# Patient Record
Sex: MALE | Race: WHITE | NOT HISPANIC OR LATINO | Employment: FULL TIME | URBAN - METROPOLITAN AREA
[De-identification: names, ages, dates, MRNs, and addresses within clinical notes are randomized per-mention and may not be internally consistent; named-entity substitution may affect disease eponyms.]

---

## 2018-07-25 ENCOUNTER — TRANSFERRED RECORDS (OUTPATIENT)
Dept: HEALTH INFORMATION MANAGEMENT | Facility: CLINIC | Age: 38
End: 2018-07-25

## 2021-04-08 ENCOUNTER — IMMUNIZATION (OUTPATIENT)
Dept: NURSING | Facility: CLINIC | Age: 41
End: 2021-04-08
Payer: COMMERCIAL

## 2021-04-08 PROCEDURE — 91300 PR COVID VAC PFIZER DIL RECON 30 MCG/0.3 ML IM: CPT

## 2021-04-08 PROCEDURE — 0001A PR COVID VAC PFIZER DIL RECON 30 MCG/0.3 ML IM: CPT

## 2021-04-11 ENCOUNTER — HEALTH MAINTENANCE LETTER (OUTPATIENT)
Age: 41
End: 2021-04-11

## 2021-04-29 ENCOUNTER — IMMUNIZATION (OUTPATIENT)
Dept: NURSING | Facility: CLINIC | Age: 41
End: 2021-04-29
Attending: INTERNAL MEDICINE
Payer: COMMERCIAL

## 2021-04-29 PROCEDURE — 91300 PR COVID VAC PFIZER DIL RECON 30 MCG/0.3 ML IM: CPT

## 2021-04-29 PROCEDURE — 0002A PR COVID VAC PFIZER DIL RECON 30 MCG/0.3 ML IM: CPT

## 2021-08-24 ENCOUNTER — NURSE TRIAGE (OUTPATIENT)
Dept: NURSING | Facility: CLINIC | Age: 41
End: 2021-08-24

## 2021-08-25 NOTE — TELEPHONE ENCOUNTER
"\"I have been having pain and tingling on both side of my face for several days now. I have some mouth issues and sores that I deal with, but this doesn't feel like that.   It's the jaw area on both sides.   It's a weird \"pulling\" feeling.  Last night my heart rate was 100. If I touch it , it gets worse.I am concerned about tetanus. I have not have any big cuts or nails in the foot, things like that, but I have been working on some outdoor projects and fencing and had a few small cuts. I can't remember my last tetanus shot. I would like to make an appt\"   Denies any other sx at this time  Triaged,. Gave home care advice and when to seek emergency care tonight if needed   Also advised to be seen within 24 hrs and transferred to scheduling for appt  Ariana Whitten RN [facility]           Reason for Disposition    Face pain present > 24 hours    Additional Information    Negative: Difficulty breathing or unusual sweating (e.g., sweating without exertion)    Negative: Can't close the mouth fully (i.e., \"mouth is locked open\", patient will have difficulty talking)    Negative: [1] Fever AND [2] localized red rash    Negative: [1] Fever AND [2] area of face is swollen    Negative: [1] New onset jaw pain AND [2] unknown cause AND [3] at least one cardiac risk factor (i.e., hypertension, diabetes, obesity, smoker or strong family history of heart disease)    Negative: Patient sounds very sick or weak to the triager    Negative: [1] SEVERE pain (e.g., excruciating) AND [2] not improved after 2 hours of pain medicine    Negative: [1] Localized red rash AND [2] painful to the touch    Negative: [1] Painful rash AND [2] multiple small blisters grouped together (i.e., dermatomal distribution or \"band\" or \"stripe\" on one side of face)    Negative: [1] Swollen area of face AND [2] toothache    Negative: [1] Swollen area of face AND [2] is painful to touch    Negative: Swelling around the eye    Negative: Shock " "suspected (e.g., cold/pale/clammy skin, too weak to stand, low BP, rapid pulse)    Negative: [1] Similar pain previously AND [2] it was from \"heart attack\"    Negative: [1] Similar pain previously AND [2] it was from \"angina\" AND [3] not relieved by nitroglycerin    Negative: Sounds like a life-threatening emergency to the triager    Protocols used: FACE PAIN-A-AH      "

## 2021-09-25 ENCOUNTER — HEALTH MAINTENANCE LETTER (OUTPATIENT)
Age: 41
End: 2021-09-25

## 2022-05-07 ENCOUNTER — HEALTH MAINTENANCE LETTER (OUTPATIENT)
Age: 42
End: 2022-05-07

## 2022-06-21 ENCOUNTER — HOSPITAL ENCOUNTER (EMERGENCY)
Facility: CLINIC | Age: 42
Discharge: HOME | End: 2022-06-21
Attending: EMERGENCY MEDICINE | Admitting: EMERGENCY MEDICINE
Payer: COMMERCIAL

## 2022-06-21 VITALS
SYSTOLIC BLOOD PRESSURE: 140 MMHG | HEART RATE: 86 BPM | DIASTOLIC BLOOD PRESSURE: 95 MMHG | BODY MASS INDEX: 29.89 KG/M2 | TEMPERATURE: 98.1 F | OXYGEN SATURATION: 97 % | WEIGHT: 225 LBS | RESPIRATION RATE: 18 BRPM

## 2022-06-21 DIAGNOSIS — T18.128A FOOD IMPACTION OF ESOPHAGUS, INITIAL ENCOUNTER: ICD-10-CM
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
## 2022-06-21 LAB — SARS-COV-2 RNA RESP QL NAA+PROBE: NEGATIVE

## 2022-06-21 PROCEDURE — 255N000001 HC RX 255: Performed by: EMERGENCY MEDICINE

## 2022-06-21 PROCEDURE — U0003 INFECTIOUS AGENT DETECTION BY NUCLEIC ACID (DNA OR RNA); SEVERE ACUTE RESPIRATORY SYNDROME CORONAVIRUS 2 (SARS-COV-2) (CORONAVIRUS DISEASE [COVID-19]), AMPLIFIED PROBE TECHNIQUE, MAKING USE OF HIGH THROUGHPUT TECHNOLOGIES AS DESCRIBED BY CMS-2020-01-R: HCPCS | Performed by: EMERGENCY MEDICINE

## 2022-06-21 PROCEDURE — 96361 HYDRATE IV INFUSION ADD-ON: CPT

## 2022-06-21 PROCEDURE — C9803 HOPD COVID-19 SPEC COLLECT: HCPCS

## 2022-06-21 PROCEDURE — 258N000003 HC RX IP 258 OP 636: Performed by: EMERGENCY MEDICINE

## 2022-06-21 PROCEDURE — 96372 THER/PROPH/DIAG INJ SC/IM: CPT | Mod: 59 | Performed by: EMERGENCY MEDICINE

## 2022-06-21 PROCEDURE — 250N000011 HC RX IP 250 OP 636: Performed by: EMERGENCY MEDICINE

## 2022-06-21 PROCEDURE — 96374 THER/PROPH/DIAG INJ IV PUSH: CPT

## 2022-06-21 PROCEDURE — 99284 EMERGENCY DEPT VISIT MOD MDM: CPT | Mod: 25

## 2022-06-21 RX ORDER — SODIUM CHLORIDE 9 MG/ML
INJECTION, SOLUTION INTRAVENOUS CONTINUOUS
Status: DISCONTINUED | OUTPATIENT
Start: 2022-06-21 | End: 2022-06-21 | Stop reason: HOSPADM

## 2022-06-21 RX ADMIN — ANTACID/ANTIFLATULENT 4 G: 380; 550; 10; 10 GRANULE, EFFERVESCENT ORAL at 20:23

## 2022-06-21 RX ADMIN — SODIUM CHLORIDE 1000 ML: 9 INJECTION, SOLUTION INTRAVENOUS at 19:34

## 2022-06-21 RX ADMIN — GLUCAGON HYDROCHLORIDE 1 MG: KIT at 19:25

## 2022-06-21 ASSESSMENT — ENCOUNTER SYMPTOMS
EYE REDNESS: 0
ARTHRALGIAS: 0
SHORTNESS OF BREATH: 0
COLOR CHANGE: 0
HEADACHES: 0
FEVER: 0
NECK STIFFNESS: 0
DIFFICULTY URINATING: 0
CONFUSION: 0

## 2022-06-22 NOTE — ED TRIAGE NOTES
"Pt reports that he took a bite of a corn dog 2 hours PTA and \"now its stuck\" pt reports hx of food bolus. PT VSS and ABC's intact. Spitting during triage        "

## 2022-06-22 NOTE — DISCHARGE INSTRUCTIONS
Return to the ER for difficulty breathing or swallowing or any new concerns.    Over the next 2 to 3 days please take a soft diet and gradually advance to firmer foods.

## 2022-06-22 NOTE — ED PROVIDER NOTES
History     Chief Complaint:  Food Bolus        HPI   Josh Vargas is a 42 year old male who presents with sensation of esophageal impaction.  About 2 hours prior to arrival he took a bite of a corn dog that he feels he is still able to swallow.  He is not able to drink fluids or tolerate his secretions.  The patient reportedly has a history of multiple esophageal impactions in the past.  He has followed with gastroenterology and says that he has had more than 1 prior esophageal dilatation.  He typically will use a fluticasone inhaler when he has a a sensation of a food bolus at home and feels that this helps to resolve his symptoms.  He reports that he was prescribed this by his gastroenterologist as he was thought to have allergic or possibly eosinophilic esophagitis.  He tried this tonight without relief.  He reports that he did drink some fizzy soda without relief.  He feels that this caused discomfort though    ROS:  Review of Systems   Constitutional: Negative for fever.   HENT: Negative for congestion.    Eyes: Negative for redness.   Respiratory: Negative for shortness of breath.    Cardiovascular: Negative for chest pain.   Gastrointestinal:        Sensation of inability to swallow.   Genitourinary: Negative for difficulty urinating.   Musculoskeletal: Negative for arthralgias and neck stiffness.   Skin: Negative for color change.   Neurological: Negative for headaches.   Psychiatric/Behavioral: Negative for confusion.          Allergies:  Iodine  Shellfish Allergy     Medications:    omeprazole (PRILOSEC) 20 MG DR capsule  azithromycin (ZITHROMAX) 250 MG tablet  guaiFENesin-codeine (ROBITUSSIN AC) 100-10 MG/5ML SOLN        Past Medical History:    No past medical history on file.    Past Surgical History:    No past surgical history on file.     Family History:    family history is not on file.    Social History:   reports that he has never smoked. He has never used smokeless tobacco. He reports  current alcohol use. He reports that he does not use drugs.  PCP: [facility]-     Physical Exam     Patient Vitals for the past 24 hrs:   BP Temp Temp src Pulse Resp SpO2 Weight   06/21/22 2015 (!) 140/95 -- -- 86 -- 97 % --   06/21/22 1910 (!) 144/95 -- -- 87 -- 98 % --   06/21/22 1904 (!) 143/104 98.1  F (36.7  C) Temporal 86 18 98 % 102.1 kg (225 lb)        Physical Exam  Constitutional:       General: He is not in acute distress.     Appearance: He is not diaphoretic.   HENT:      Head: Atraumatic.   Eyes:      General: No scleral icterus.     Pupils: Pupils are equal, round, and reactive to light.   Cardiovascular:      Rate and Rhythm: Normal rate and regular rhythm.      Heart sounds: Normal heart sounds.   Pulmonary:      Effort: No respiratory distress.      Breath sounds: Normal breath sounds.   Abdominal:      General: Bowel sounds are normal.      Palpations: Abdomen is soft.      Tenderness: There is no abdominal tenderness.   Musculoskeletal:         General: No tenderness.   Skin:     General: Skin is warm.      Capillary Refill: Capillary refill takes less than 2 seconds.      Findings: No rash.   Neurological:      General: No focal deficit present.      Mental Status: He is oriented to person, place, and time.   Psychiatric:         Mood and Affect: Mood normal.         Behavior: Behavior normal.           Emergency Department Course   Emergency Department Course:  This patient is a pleasant 42-year-old man who presents to the ED with an esophageal impaction.  He was eating a corn dog which she feels he was unable to pass.  He used a Flovent inhaler.  He says he drank some soda which did not produce any relief.  He is reluctant to try anything else other than glucagon to help pass the impaction.  This was tried without relief.  He was hydrated with fluids.  I spoke with Dr. Alexander who is covering gastroenterology and graciously agreed to help with endoscopy.   However, while waiting to the endoscopy team the patient requested to use EZ gas which she had initially been reluctant to do.  Fortunately this appears to have produced passage of the food impaction.  The patient was not able to drink fluids without any difficulty at all.  He feels that he is now asymptomatic.  Dr. Alexander and the endoscopy team were called off.  The patient says he stopped taking his omeprazole several months ago.  He will be restarted on this.  He will take a soft diet for the next couple of days and will follow-up in the short-term with gastroenterology as he likely still will need an elective upper endoscopy to look for esophageal stricture.    While awaiting  Interventions:  Medications   0.9% sodium chloride BOLUS (0 mLs Intravenous Stopped 6/21/22 2045)     Followed by   sodium chloride 0.9% infusion (has no administration in time range)   glucagon injection 1 mg (1 mg Intramuscular Given 6/21/22 1925)   sod bicarbonate-citric acid-simethicone (EZ GAS) 2.21-1.53-0.04 g packet 4 g (4 g Oral Given 6/21/22 2023)        Disposition:  The patient was discharged to home.     Impression & Plan      Diagnosis:    ICD-10-CM    1. Food impaction of esophagus, initial encounter  T18.128A         Discharge Medications:  New Prescriptions    OMEPRAZOLE (PRILOSEC) 20 MG DR CAPSULE    Take 1 capsule (20 mg) by mouth daily for 30 days        6/21/2022   Oleg Ross, *      Oleg Ross MD  06/21/22 2058

## 2023-01-07 ENCOUNTER — HEALTH MAINTENANCE LETTER (OUTPATIENT)
Age: 43
End: 2023-01-07

## 2023-02-11 ENCOUNTER — OFFICE VISIT (OUTPATIENT)
Dept: URGENT CARE | Facility: URGENT CARE | Age: 43
End: 2023-02-11
Payer: COMMERCIAL

## 2023-02-11 VITALS
DIASTOLIC BLOOD PRESSURE: 87 MMHG | HEART RATE: 72 BPM | OXYGEN SATURATION: 96 % | SYSTOLIC BLOOD PRESSURE: 127 MMHG | TEMPERATURE: 98.6 F

## 2023-02-11 DIAGNOSIS — J02.9 VIRAL PHARYNGITIS: Primary | ICD-10-CM

## 2023-02-11 LAB — DEPRECATED S PYO AG THROAT QL EIA: NEGATIVE

## 2023-02-11 PROCEDURE — 99202 OFFICE O/P NEW SF 15 MIN: CPT | Performed by: FAMILY MEDICINE

## 2023-02-11 PROCEDURE — 87651 STREP A DNA AMP PROBE: CPT | Performed by: FAMILY MEDICINE

## 2023-02-11 NOTE — PROGRESS NOTES
Assessment & Plan     Viral pharyngitis  - Streptococcus A Rapid Screen w/Reflex to PCR  - Group A Streptococcus PCR Throat Swab     Symptoms consistent with a viral pharyngitis this present time does not show features of sinusitis.  Lung exam is unremarkable.  Home COVID testing done twice is negative will defer PCR testing at this time.  Symptomatic management with ibuprofen, Tylenol and other over-the-counter cold remedies were recommended.  Follow-up as needed if symptoms worsen.    Spencer Astorga MD   [facility]    Shawn Moreno is a 43 year old male who presents to clinic today for the following health issues:  Chief Complaint   Patient presents with     Urgent Care     Sore throat and congestion which started couple days ago.     HPI    Sore throat started last couple days minimal cough if at all.  No fevers present.  Does not want to have strep be spreading throughout the household as he is a 6 and 9-year-old at home.  No exposures to strep COVID or flu.  He has done 2 home COVID test which are negative. Has tried halls throat remedy.       There are no problems to display for this patient.      Current Outpatient Medications   Medication     azithromycin (ZITHROMAX) 250 MG tablet     guaiFENesin-codeine (ROBITUSSIN AC) 100-10 MG/5ML SOLN     No current facility-administered medications for this visit.           Objective    /87 (BP Location: Right arm, Patient Position: Sitting, Cuff Size: Adult Regular)   Pulse 72   Temp 98.6  F (37  C) (Tympanic)   SpO2 96%   Physical Exam     Throat: no enlarged tonsils,no trismus, uvula midline  CV: HDS  Pulm: clear bilaterally      Results for orders placed or performed in visit on 02/11/23   Streptococcus A Rapid Screen w/Reflex to PCR     Status: Normal    Specimen: Throat; Swab   Result Value Ref Range    Group A Strep antigen Negative Negative                     The use of Dragon/Flypeeps dictation services may have been used to  construct the content in this note; any grammatical or spelling errors are non-intentional. Please contact the author of this note directly if you are in need of any clarification.

## 2023-02-11 NOTE — PATIENT INSTRUCTIONS
We will call you if the strep PCR returns positive      If your symptoms worsen or develop fever, shortness of breath -- seek medical attention right away      Ibuprofen 400-600mg and/or tylenol 500mg every 4 hours for discomfort      If cough develops use generic robitussin and/or honey remedies

## 2023-02-12 LAB — GROUP A STREP BY PCR: NOT DETECTED

## 2023-04-27 ENCOUNTER — ANCILLARY PROCEDURE (OUTPATIENT)
Dept: GENERAL RADIOLOGY | Facility: CLINIC | Age: 43
End: 2023-04-27
Attending: INTERNAL MEDICINE
Payer: COMMERCIAL

## 2023-04-27 ENCOUNTER — OFFICE VISIT (OUTPATIENT)
Dept: INTERNAL MEDICINE | Facility: CLINIC | Age: 43
End: 2023-04-27
Payer: COMMERCIAL

## 2023-04-27 VITALS
HEART RATE: 80 BPM | DIASTOLIC BLOOD PRESSURE: 86 MMHG | TEMPERATURE: 97.9 F | OXYGEN SATURATION: 95 % | SYSTOLIC BLOOD PRESSURE: 112 MMHG | RESPIRATION RATE: 20 BRPM | WEIGHT: 233.7 LBS | BODY MASS INDEX: 30.97 KG/M2 | HEIGHT: 73 IN

## 2023-04-27 DIAGNOSIS — R10.12 LUQ ABDOMINAL PAIN: ICD-10-CM

## 2023-04-27 DIAGNOSIS — E03.9 HYPOTHYROIDISM, UNSPECIFIED TYPE: ICD-10-CM

## 2023-04-27 DIAGNOSIS — Z13.220 SCREENING FOR HYPERLIPIDEMIA: ICD-10-CM

## 2023-04-27 DIAGNOSIS — M25.511 RIGHT SHOULDER PAIN, UNSPECIFIED CHRONICITY: ICD-10-CM

## 2023-04-27 DIAGNOSIS — Z23 NEED FOR TD VACCINE: ICD-10-CM

## 2023-04-27 DIAGNOSIS — Z11.4 SCREENING FOR HIV (HUMAN IMMUNODEFICIENCY VIRUS): ICD-10-CM

## 2023-04-27 DIAGNOSIS — Z00.00 ANNUAL PHYSICAL EXAM: Primary | ICD-10-CM

## 2023-04-27 DIAGNOSIS — Z11.59 NEED FOR HEPATITIS C SCREENING TEST: ICD-10-CM

## 2023-04-27 LAB
ALBUMIN UR-MCNC: NEGATIVE MG/DL
APPEARANCE UR: CLEAR
BASOPHILS # BLD AUTO: 0 10E3/UL (ref 0–0.2)
BASOPHILS NFR BLD AUTO: 1 %
BILIRUB UR QL STRIP: NEGATIVE
COLOR UR AUTO: YELLOW
EOSINOPHIL # BLD AUTO: 0.2 10E3/UL (ref 0–0.7)
EOSINOPHIL NFR BLD AUTO: 4 %
ERYTHROCYTE [DISTWIDTH] IN BLOOD BY AUTOMATED COUNT: 12.7 % (ref 10–15)
GLUCOSE UR STRIP-MCNC: NEGATIVE MG/DL
HCT VFR BLD AUTO: 44.9 % (ref 40–53)
HGB BLD-MCNC: 15.5 G/DL (ref 13.3–17.7)
HGB UR QL STRIP: NEGATIVE
IMM GRANULOCYTES # BLD: 0 10E3/UL
IMM GRANULOCYTES NFR BLD: 1 %
KETONES UR STRIP-MCNC: NEGATIVE MG/DL
LEUKOCYTE ESTERASE UR QL STRIP: NEGATIVE
LYMPHOCYTES # BLD AUTO: 2.4 10E3/UL (ref 0.8–5.3)
LYMPHOCYTES NFR BLD AUTO: 42 %
MCH RBC QN AUTO: 30.7 PG (ref 26.5–33)
MCHC RBC AUTO-ENTMCNC: 34.5 G/DL (ref 31.5–36.5)
MCV RBC AUTO: 89 FL (ref 78–100)
MONOCYTES # BLD AUTO: 0.5 10E3/UL (ref 0–1.3)
MONOCYTES NFR BLD AUTO: 9 %
NEUTROPHILS # BLD AUTO: 2.6 10E3/UL (ref 1.6–8.3)
NEUTROPHILS NFR BLD AUTO: 45 %
NITRATE UR QL: NEGATIVE
PH UR STRIP: 6 [PH] (ref 5–7)
PLATELET # BLD AUTO: 284 10E3/UL (ref 150–450)
RBC # BLD AUTO: 5.05 10E6/UL (ref 4.4–5.9)
SP GR UR STRIP: 1.01 (ref 1–1.03)
UROBILINOGEN UR STRIP-ACNC: 0.2 E.U./DL
WBC # BLD AUTO: 5.8 10E3/UL (ref 4–11)

## 2023-04-27 PROCEDURE — 99213 OFFICE O/P EST LOW 20 MIN: CPT | Mod: 25 | Performed by: INTERNAL MEDICINE

## 2023-04-27 PROCEDURE — 86803 HEPATITIS C AB TEST: CPT | Performed by: INTERNAL MEDICINE

## 2023-04-27 PROCEDURE — 83690 ASSAY OF LIPASE: CPT | Performed by: INTERNAL MEDICINE

## 2023-04-27 PROCEDURE — 80050 GENERAL HEALTH PANEL: CPT | Performed by: INTERNAL MEDICINE

## 2023-04-27 PROCEDURE — 73030 X-RAY EXAM OF SHOULDER: CPT | Mod: TC | Performed by: RADIOLOGY

## 2023-04-27 PROCEDURE — 36415 COLL VENOUS BLD VENIPUNCTURE: CPT | Performed by: INTERNAL MEDICINE

## 2023-04-27 PROCEDURE — 87389 HIV-1 AG W/HIV-1&-2 AB AG IA: CPT | Performed by: INTERNAL MEDICINE

## 2023-04-27 PROCEDURE — 99396 PREV VISIT EST AGE 40-64: CPT | Mod: 25 | Performed by: INTERNAL MEDICINE

## 2023-04-27 PROCEDURE — 81003 URINALYSIS AUTO W/O SCOPE: CPT | Performed by: INTERNAL MEDICINE

## 2023-04-27 PROCEDURE — 90471 IMMUNIZATION ADMIN: CPT | Performed by: INTERNAL MEDICINE

## 2023-04-27 PROCEDURE — 90714 TD VACC NO PRESV 7 YRS+ IM: CPT | Performed by: INTERNAL MEDICINE

## 2023-04-27 PROCEDURE — 80061 LIPID PANEL: CPT | Performed by: INTERNAL MEDICINE

## 2023-04-27 RX ORDER — MULTIPLE VITAMINS W/ MINERALS TAB 9MG-400MCG
1 TAB ORAL DAILY
COMMUNITY

## 2023-04-27 ASSESSMENT — ENCOUNTER SYMPTOMS
DIZZINESS: 0
DYSURIA: 0
FREQUENCY: 0
ABDOMINAL PAIN: 1
NERVOUS/ANXIOUS: 0
WEAKNESS: 0
COUGH: 0
HEMATURIA: 0
CHILLS: 0
PARESTHESIAS: 0
SORE THROAT: 0
EYE PAIN: 0
SHORTNESS OF BREATH: 0
HEADACHES: 0
MYALGIAS: 1
ARTHRALGIAS: 1
CONSTIPATION: 0
DIARRHEA: 0
HEARTBURN: 0
JOINT SWELLING: 0
PALPITATIONS: 0
FEVER: 0
NAUSEA: 0
HEMATOCHEZIA: 0

## 2023-04-27 ASSESSMENT — PAIN SCALES - GENERAL: PAINLEVEL: NO PAIN (0)

## 2023-04-27 NOTE — PROGRESS NOTES
"SUBJECTIVE:   CC: Josh is an 43 year old who presents for preventative health visit.       4/27/2023    11:22 AM   Additional Questions   Roomed by Meri Vergara   Accompanied by alone         4/27/2023    11:22 AM   Patient Reported Additional Medications   Patient reports taking the following new medications none     Patient has been advised of split billing requirements and indicates understanding: Yes  Patient is a 43-year-old  male who presents to the clinic for his annual physical.  He does have some concerns that he would like to address today.  Patient reports that for the last 3 months he has had persistent episodes of recurrent abdominal pain.  Patient reports that he has had a dull ache located in his left side of his abdomen.  He had been seen by an orthopedist who did perform an MRI of his back as patient was concerned that the pain may be radiating from his spine.  His MRI was reportedly unremarkable.  Patient denies any exacerbating factors for his discomfort.  He does report to these episodes do seem to be occurring much more frequently.  He is having bowel movements per his normal routine, but he does report that his urine appears \"chalky.\"  Patient denies any issues with fever, chills, nausea, or vomiting.  He does not currently take any prescribed medications.  He has also been dealing with persistent episodes of pain involving his right shoulder joint.  He does report some limitations in range of motion of the affected joint as well.  Patient does have difficulty lifting his right arm greater than shoulder level.  He denies any traumatic event or injury that triggered his discomfort.  Patient does not take any medication for management of his discomfort.  This pain has been present for approximately 3 months.  He denies any prior shoulder problems.  Patient is right-hand dominant.  He reports stable appetite.  Patient is sleeping well.  He is fasting for lab work today.  Patient does " report that he has previously had issues with hypothyroidism, but he states he has never been on medication for his thyroid.    Healthy Habits:     Getting at least 3 servings of Calcium per day:  Yes    Bi-annual eye exam:  NO    Dental care twice a year:  NO    Sleep apnea or symptoms of sleep apnea:  None    Diet:  Regular (no restrictions)    Frequency of exercise:  2-3 days/week    Duration of exercise:  15-30 minutes    Taking medications regularly:  Yes    Medication side effects:  Not applicable    PHQ-2 Total Score: 0    Additional concerns today:  Yes      Today's PHQ-2 Score:       4/27/2023    11:18 AM   PHQ-2 ( 1999 [employer])   Q1: Little interest or pleasure in doing things 0   Q2: Feeling down, depressed or hopeless 0   PHQ-2 Score 0   Q1: Little interest or pleasure in doing things Not at all    Not at all   Q2: Feeling down, depressed or hopeless Not at all    Not at all   PHQ-2 Score 0    0       Have you ever done Advance Care Planning? (For example, a Health Directive, POLST, or a discussion with a medical provider or your loved ones about your wishes): No, advance care planning information given to patient to review.  Advanced care planning was discussed at today's visit.    Social History     Tobacco Use     Smoking status: Never     Smokeless tobacco: Never   Vaping Use     Vaping status: Never Used   Substance Use Topics     Alcohol use: Yes     Alcohol/week: 0.0 standard drinks of alcohol             4/27/2023    11:17 AM   Alcohol Use   Prescreen: >3 drinks/day or >7 drinks/week? No       Last PSA: No results found for: PSA    Reviewed orders with patient. Reviewed health maintenance and updated orders accordingly - Yes  Lab work is in process    Reviewed and updated as needed this visit by clinical staff   Tobacco  Allergies  Meds              Reviewed and updated as needed this visit by Provider                     Review of Systems   Constitutional: Negative for chills and fever.  "  HENT: Negative for congestion, ear pain, hearing loss and sore throat.    Eyes: Negative for pain and visual disturbance.   Respiratory: Negative for cough and shortness of breath.    Cardiovascular: Negative for chest pain, palpitations and peripheral edema.   Gastrointestinal: Positive for abdominal pain. Negative for constipation, diarrhea, heartburn, hematochezia and nausea.   Genitourinary: Positive for impotence. Negative for dysuria, frequency, genital sores, hematuria, penile discharge and urgency.   Musculoskeletal: Positive for arthralgias and myalgias. Negative for joint swelling.   Skin: Negative for rash.   Neurological: Negative for dizziness, weakness, headaches and paresthesias.   Psychiatric/Behavioral: Negative for mood changes. The patient is not nervous/anxious.          OBJECTIVE:   Blood pressure 112/86, pulse 80, temperature 97.9  F (36.6  C), temperature source Oral, resp. rate 20, height 1.854 m (6' 1\"), weight 106 kg (233 lb 11.2 oz), SpO2 95 %.        Physical Exam  Vitals reviewed.   HENT:      Head: Normocephalic and atraumatic.      Right Ear: Tympanic membrane, ear canal and external ear normal.      Left Ear: Tympanic membrane, ear canal and external ear normal.      Mouth/Throat:      Mouth: Mucous membranes are moist.      Pharynx: Oropharynx is clear.   Eyes:      Extraocular Movements: Extraocular movements intact.      Conjunctiva/sclera: Conjunctivae normal.      Pupils: Pupils are equal, round, and reactive to light.   Cardiovascular:      Rate and Rhythm: Normal rate and regular rhythm.      Pulses: Normal pulses.      Heart sounds: Normal heart sounds.   Pulmonary:      Effort: Pulmonary effort is normal.      Breath sounds: Normal breath sounds.   Abdominal:      General: Bowel sounds are normal. There is no distension.      Palpations: Abdomen is soft. There is no mass.      Tenderness: There is abdominal tenderness (Left lower and upper quadrants.). There is no guarding " or rebound.      Hernia: No hernia is present.   Musculoskeletal:      Right shoulder: Tenderness (Over the lateral aspect of right shoulder joint.) present. No swelling. Decreased range of motion (Patient had pain associated with abduction greater than 90 degrees.  He also had pain associated with external rotation of the right shoulder joint.  Dsouza sign was negative.).      Left shoulder: Normal.      Cervical back: Normal range of motion and neck supple.   Skin:     General: Skin is warm and dry.      Capillary Refill: Capillary refill takes less than 2 seconds.   Neurological:      General: No focal deficit present.      Mental Status: He is alert and oriented to person, place, and time.       Diagnostic Test Results: CMP, CBC, FLP, TSH, hepatitis C screening, HIV screen, lipase, and urinalysis are pending.  X-ray of right shoulder, 3 views, is pending as well.    ASSESSMENT/PLAN:   (Z00.00) Annual physical exam  (primary encounter diagnosis)  Comment: At this time, patient does have a relatively unremarkable physical examination.  His blood pressure is noted to be at an acceptable level.  We did spend some time discussing appropriate dietary lifestyle modifications necessary to help keep his weight and blood pressure under good control.  Fasting labs are pending.  All health maintenance items were addressed.    (Z11.4) Screening for HIV (human immunodeficiency virus)  Comment: HIV Antigen Antibody Combo is pending.    (Z11.59) Need for hepatitis C screening test  Comment: Hepatitis C Screen Reflex to HCV RNA Quant and         Genotype is pending.    (E03.9) Hypothyroidism, unspecified type  Comment: Patient reports that he has previously been told that he has had issues with hypothyroidism in the past, but he has never been on medication.  He does have a repeat TSH with reflex free T4 that is pending.    (Z23) Need for Td vaccine  Comment: TD (Adult), PF, Adsorbed (TDVAX) [IMM09]    (Z13.220) Screening for  hyperlipidemia  Comment: Lipid panel reflex to direct LDL Non-fasting is pending.    (R10.12) LUQ abdominal pain  Comment: At this time, we did spend some time discussing potential etiologies of his abdominal pain, including diverticulitis, kidney stones, and so forth.  Patient was agreeable to submit a blood sample today for CMP and lipase levels.  He will also submit a urine sample given his reports of the change in the appearance of his urine.  Lab test results are currently pending.  Patient did elect for all lab results before proceeding with any further diagnostic testing or evaluation.    (M25.511) Right shoulder pain, unspecified chronicity  Comment: Given the persistent nature of his right shoulder pain and limitations in range of motion, we did elect to proceed with x-ray imaging of the shoulder as the first step in his evaluation.  X-ray images are currently pending.  Patient will be contacted with results once available for review, and further recommendations will be made at that time.      Patient has been advised of split billing requirements and indicates understanding: Yes      COUNSELING:   Reviewed preventive health counseling, as reflected in patient instructions        He reports that he has never smoked. He has never used smokeless tobacco.            Reginald Donaldson MD  [facility]

## 2023-04-27 NOTE — NURSING NOTE
Prior to immunization administration, verified patients identity using patient s name and date of birth. Please see Immunization Activity for additional information.     Screening Questionnaire for Adult Immunization    Are you sick today?   No   Do you have allergies to medications, food, a vaccine component or latex?   No   Have you ever had a serious reaction after receiving a vaccination?   No   Do you have a long-term health problem with heart, lung, kidney, or metabolic disease (e.g., diabetes), asthma, a blood disorder, no spleen, complement component deficiency, a cochlear implant, or a spinal fluid leak?  Are you on long-term aspirin therapy?   No   Do you have cancer, leukemia, HIV/AIDS, or any other immune system problem?   No   Do you have a parent, brother, or sister with an immune system problem?   No   In the past 3 months, have you taken medications that affect  your immune system, such as prednisone, other steroids, or anticancer drugs; drugs for the treatment of rheumatoid arthritis, Crohn s disease, or psoriasis; or have you had radiation treatments?   No   Have you had a seizure, or a brain or other nervous system problem?   No   During the past year, have you received a transfusion of blood or blood    products, or been given immune (gamma) globulin or antiviral drug?   No   For women: Are you pregnant or is there a chance you could become       pregnant during the next month?   No   Have you received any vaccinations in the past 4 weeks?   No     Immunization questionnaire answers were all negative.      Injection of TD given by Meri Vergara MA. Patient instructed to remain in clinic for 15 minutes afterwards, and to report any adverse reactions.     Screening performed by Meri Vergara MA on 4/27/2023 at 12:25 PM.

## 2023-04-28 LAB
ALBUMIN SERPL BCG-MCNC: 4.9 G/DL (ref 3.5–5.2)
ALP SERPL-CCNC: 70 U/L (ref 40–129)
ALT SERPL W P-5'-P-CCNC: 56 U/L (ref 10–50)
ANION GAP SERPL CALCULATED.3IONS-SCNC: 14 MMOL/L (ref 7–15)
AST SERPL W P-5'-P-CCNC: 31 U/L (ref 10–50)
BILIRUB SERPL-MCNC: 1.1 MG/DL
BUN SERPL-MCNC: 11.8 MG/DL (ref 6–20)
CALCIUM SERPL-MCNC: 9.6 MG/DL (ref 8.6–10)
CHLORIDE SERPL-SCNC: 102 MMOL/L (ref 98–107)
CHOLEST SERPL-MCNC: 250 MG/DL
CREAT SERPL-MCNC: 0.88 MG/DL (ref 0.67–1.17)
DEPRECATED HCO3 PLAS-SCNC: 23 MMOL/L (ref 22–29)
GFR SERPL CREATININE-BSD FRML MDRD: >90 ML/MIN/1.73M2
GLUCOSE SERPL-MCNC: 88 MG/DL (ref 70–99)
HCV AB SERPL QL IA: NONREACTIVE
HDLC SERPL-MCNC: 46 MG/DL
HIV 1+2 AB+HIV1 P24 AG SERPL QL IA: NONREACTIVE
LDLC SERPL CALC-MCNC: 159 MG/DL
LIPASE SERPL-CCNC: 39 U/L (ref 13–60)
NONHDLC SERPL-MCNC: 204 MG/DL
POTASSIUM SERPL-SCNC: 4.5 MMOL/L (ref 3.4–5.3)
PROT SERPL-MCNC: 7.5 G/DL (ref 6.4–8.3)
SODIUM SERPL-SCNC: 139 MMOL/L (ref 136–145)
TRIGL SERPL-MCNC: 224 MG/DL
TSH SERPL DL<=0.005 MIU/L-ACNC: 2.91 UIU/ML (ref 0.3–4.2)

## 2023-06-08 ENCOUNTER — MYC MEDICAL ADVICE (OUTPATIENT)
Dept: INTERNAL MEDICINE | Facility: CLINIC | Age: 43
End: 2023-06-08
Payer: COMMERCIAL

## 2023-06-08 DIAGNOSIS — N50.9 SCROTAL LESION: ICD-10-CM

## 2023-06-08 DIAGNOSIS — R10.12 LUQ ABDOMINAL PAIN: Primary | ICD-10-CM

## 2023-06-16 NOTE — TELEPHONE ENCOUNTER
Patient would like ultrasound ordered for possible epididymal cyst.     Rhonda Renner RN  [facility]

## 2023-06-19 NOTE — TELEPHONE ENCOUNTER
Sent [facility] message to patient.    Rafiq Kothari, Triage RN [facility]  1:06 PM 6/19/2023

## 2023-06-19 NOTE — TELEPHONE ENCOUNTER
Ultrasound has been ordered.  His upcoming imaging for further evaluation of his abdominal pain has had to be changed.  His insurance would not approve the MRI, but we can proceed with a CT scan for further evaluation of his abdominal pain at this time.  Updated orders have been placed.

## 2023-06-21 ENCOUNTER — HOSPITAL ENCOUNTER (OUTPATIENT)
Dept: ULTRASOUND IMAGING | Facility: CLINIC | Age: 43
Discharge: HOME | End: 2023-06-21
Attending: INTERNAL MEDICINE | Admitting: INTERNAL MEDICINE
Payer: COMMERCIAL

## 2023-06-21 DIAGNOSIS — N50.9 SCROTAL LESION: ICD-10-CM

## 2023-06-21 PROCEDURE — 76870 US EXAM SCROTUM: CPT

## 2023-07-07 ENCOUNTER — MYC MEDICAL ADVICE (OUTPATIENT)
Dept: INTERNAL MEDICINE | Facility: CLINIC | Age: 43
End: 2023-07-07
Payer: COMMERCIAL

## 2023-07-11 ENCOUNTER — TELEPHONE (OUTPATIENT)
Dept: INTERNAL MEDICINE | Facility: CLINIC | Age: 43
End: 2023-07-11
Payer: COMMERCIAL

## 2023-07-12 NOTE — TELEPHONE ENCOUNTER
"Patient called. He reported that he was told by his insurance company that the Md needs to call a number and state that \"the patient is allergic to CT contrast dye and needs the test that was ordered.\"       Patient stated he was told it is \"this simple\". Just call [phone]     Patient could not answer if there were prompts after dialing or if an RN can call.     The number is [phone]    Patient number is [phone]    "

## 2023-07-12 NOTE — TELEPHONE ENCOUNTER
Patient called and informed of provider message. He will call to schedule MRI appointment - offered to transfer call to scheduling, but he does not have calendar with him.    Rhonda Renner RN  [facility]

## 2023-07-12 NOTE — TELEPHONE ENCOUNTER
I spoke with the insurance company, and the MRI of the abdomen and pelvis has been approved.  He has 60 days to have the imaging study completed.  It would appear that request for additional information was sent to the [facility] group rather than to [facility].

## 2023-07-12 NOTE — TELEPHONE ENCOUNTER
I have not received any information from his insurance company other than the for the initially ordered MRI.  Imaging orders for the CT scan were submitted. No requests for any exemptions have been received.

## 2023-07-12 NOTE — TELEPHONE ENCOUNTER
Called and left patient a voice mail message on July 12, 2023 12:00 PM to call back the clinic.    Rafiq Kothari, Triage RN [facility]  12:00 PM 7/12/2023

## 2024-01-03 ENCOUNTER — MYC MEDICAL ADVICE (OUTPATIENT)
Dept: INTERNAL MEDICINE | Facility: CLINIC | Age: 44
End: 2024-01-03
Payer: COMMERCIAL

## 2024-01-03 DIAGNOSIS — R10.12 LUQ ABDOMINAL PAIN: ICD-10-CM

## 2024-01-03 DIAGNOSIS — N50.9 SCROTAL LESION: Primary | ICD-10-CM

## 2024-01-04 NOTE — TELEPHONE ENCOUNTER
"Please see patient's mychart messages below.    Per chart, had a testicular US 6/21/23 and impression is \"recommend follow-up exam with scrotal ultrasound in 3-6 months to assess for  interval change.\"    Does patient need a follow up appointment or ok to just place imaging order?     Please advise, thanks.   "

## 2024-01-12 ENCOUNTER — HOSPITAL ENCOUNTER (OUTPATIENT)
Dept: ULTRASOUND IMAGING | Facility: CLINIC | Age: 44
Discharge: HOME | End: 2024-01-12
Attending: INTERNAL MEDICINE | Admitting: INTERNAL MEDICINE
Payer: COMMERCIAL

## 2024-01-12 DIAGNOSIS — N50.9 SCROTAL LESION: ICD-10-CM

## 2024-01-12 PROCEDURE — 76870 US EXAM SCROTUM: CPT

## 2024-01-12 PROCEDURE — 93976 VASCULAR STUDY: CPT

## 2024-01-20 ENCOUNTER — NURSE TRIAGE (OUTPATIENT)
Dept: NURSING | Facility: CLINIC | Age: 44
End: 2024-01-20
Payer: COMMERCIAL

## 2024-01-20 NOTE — TELEPHONE ENCOUNTER
Patient was scheduled for MRI today when he arrived they told him it was for upper abdominal area, patient states it was supposed to be for lower abdominal area, he did not have the MRI as the technician could not confirm. Patient is leaving for out of the country on Thursday and would like this to enoch done before then. Would like call back on Monday as soon as possible to clear up. Ok to leave detailed message.   Vandana Beltran RN on 1/20/2024 at 11:03 AM      Reason for Disposition   [1] Caller requesting NON-URGENT health information AND [2] PCP's office is the best resource    Additional Information   Negative: [1] Caller is not with the adult (patient) AND [2] reporting urgent symptoms   Negative: Lab result questions   Negative: Medication questions   Negative: Caller can't be reached by phone   Negative: Caller has already spoken to PCP or another triager   Negative: RN needs further essential information from caller in order to complete triage   Negative: Requesting regular office appointment    Protocols used: Information Only Call - No Triage-A-

## 2024-01-22 NOTE — TELEPHONE ENCOUNTER
Called and left detailed message for patient regarding provider message. Instructed patient to call clinic back with further questions/concerns.    Thank you,  Rafiq Kothari, Triage RN [facility]  7:39 AM 1/22/2024

## 2024-02-03 ENCOUNTER — NURSE TRIAGE (OUTPATIENT)
Dept: NURSING | Facility: CLINIC | Age: 44
End: 2024-02-03
Payer: COMMERCIAL

## 2024-02-03 ENCOUNTER — HOSPITAL ENCOUNTER (EMERGENCY)
Facility: CLINIC | Age: 44
Discharge: HOME | End: 2024-02-03
Attending: EMERGENCY MEDICINE | Admitting: EMERGENCY MEDICINE
Payer: COMMERCIAL

## 2024-02-03 ENCOUNTER — APPOINTMENT (OUTPATIENT)
Dept: CT IMAGING | Facility: CLINIC | Age: 44
End: 2024-02-03
Attending: EMERGENCY MEDICINE
Payer: COMMERCIAL

## 2024-02-03 VITALS
WEIGHT: 234.57 LBS | BODY MASS INDEX: 31.09 KG/M2 | HEIGHT: 73 IN | RESPIRATION RATE: 18 BRPM | OXYGEN SATURATION: 100 % | TEMPERATURE: 97.7 F | HEART RATE: 63 BPM | DIASTOLIC BLOOD PRESSURE: 87 MMHG | SYSTOLIC BLOOD PRESSURE: 138 MMHG

## 2024-02-03 DIAGNOSIS — R10.9 ABDOMINAL PAIN OF UNKNOWN CAUSE: ICD-10-CM

## 2024-02-03 LAB
ALBUMIN UR-MCNC: 20 MG/DL
ANION GAP SERPL CALCULATED.3IONS-SCNC: 11 MMOL/L (ref 7–15)
APPEARANCE UR: ABNORMAL
BASOPHILS # BLD AUTO: 0 10E3/UL (ref 0–0.2)
BASOPHILS NFR BLD AUTO: 1 %
BILIRUB UR QL STRIP: NEGATIVE
BUN SERPL-MCNC: 11.9 MG/DL (ref 6–20)
CALCIUM SERPL-MCNC: 9.1 MG/DL (ref 8.6–10)
CHLORIDE SERPL-SCNC: 104 MMOL/L (ref 98–107)
COLOR UR AUTO: YELLOW
CREAT SERPL-MCNC: 0.87 MG/DL (ref 0.67–1.17)
DEPRECATED HCO3 PLAS-SCNC: 23 MMOL/L (ref 22–29)
EGFRCR SERPLBLD CKD-EPI 2021: >90 ML/MIN/1.73M2
EOSINOPHIL # BLD AUTO: 0.1 10E3/UL (ref 0–0.7)
EOSINOPHIL NFR BLD AUTO: 2 %
ERYTHROCYTE [DISTWIDTH] IN BLOOD BY AUTOMATED COUNT: 12.2 % (ref 10–15)
GLUCOSE SERPL-MCNC: 108 MG/DL (ref 70–99)
GLUCOSE UR STRIP-MCNC: NEGATIVE MG/DL
HCT VFR BLD AUTO: 42.3 % (ref 40–53)
HGB BLD-MCNC: 15 G/DL (ref 13.3–17.7)
HGB UR QL STRIP: ABNORMAL
HOLD SPECIMEN: NORMAL
HOLD SPECIMEN: NORMAL
IMM GRANULOCYTES # BLD: 0 10E3/UL
IMM GRANULOCYTES NFR BLD: 0 %
KETONES UR STRIP-MCNC: 60 MG/DL
LEUKOCYTE ESTERASE UR QL STRIP: NEGATIVE
LYMPHOCYTES # BLD AUTO: 1.7 10E3/UL (ref 0.8–5.3)
LYMPHOCYTES NFR BLD AUTO: 35 %
MCH RBC QN AUTO: 31.4 PG (ref 26.5–33)
MCHC RBC AUTO-ENTMCNC: 35.5 G/DL (ref 31.5–36.5)
MCV RBC AUTO: 89 FL (ref 78–100)
MONOCYTES # BLD AUTO: 0.4 10E3/UL (ref 0–1.3)
MONOCYTES NFR BLD AUTO: 9 %
MUCOUS THREADS #/AREA URNS LPF: PRESENT /LPF
NEUTROPHILS # BLD AUTO: 2.6 10E3/UL (ref 1.6–8.3)
NEUTROPHILS NFR BLD AUTO: 53 %
NITRATE UR QL: NEGATIVE
NRBC # BLD AUTO: 0 10E3/UL
NRBC BLD AUTO-RTO: 0 /100
PH UR STRIP: 5.5 [PH] (ref 5–7)
PLATELET # BLD AUTO: 288 10E3/UL (ref 150–450)
POTASSIUM SERPL-SCNC: 4 MMOL/L (ref 3.4–5.3)
RBC # BLD AUTO: 4.77 10E6/UL (ref 4.4–5.9)
RBC URINE: 1 /HPF
SODIUM SERPL-SCNC: 138 MMOL/L (ref 135–145)
SP GR UR STRIP: 1.03 (ref 1–1.03)
SQUAMOUS EPITHELIAL: 1 /HPF
UROBILINOGEN UR STRIP-MCNC: NORMAL MG/DL
WBC # BLD AUTO: 4.8 10E3/UL (ref 4–11)
WBC URINE: <1 /HPF

## 2024-02-03 PROCEDURE — 85025 COMPLETE CBC W/AUTO DIFF WBC: CPT | Performed by: EMERGENCY MEDICINE

## 2024-02-03 PROCEDURE — 36415 COLL VENOUS BLD VENIPUNCTURE: CPT | Performed by: EMERGENCY MEDICINE

## 2024-02-03 PROCEDURE — 81001 URINALYSIS AUTO W/SCOPE: CPT | Performed by: EMERGENCY MEDICINE

## 2024-02-03 PROCEDURE — 99284 EMERGENCY DEPT VISIT MOD MDM: CPT | Mod: 25

## 2024-02-03 PROCEDURE — 74176 CT ABD & PELVIS W/O CONTRAST: CPT

## 2024-02-03 PROCEDURE — 80048 BASIC METABOLIC PNL TOTAL CA: CPT | Performed by: EMERGENCY MEDICINE

## 2024-02-03 ASSESSMENT — ACTIVITIES OF DAILY LIVING (ADL): ADLS_ACUITY_SCORE: 35

## 2024-02-03 NOTE — ED PROVIDER NOTES
"  History     Chief Complaint:  Abdominal Pain       HPI   Josh \"Long\" NIRALI Vargas is a 43 year old male with a history of left lower quadrant pain intermittently over the last year or more persistent over the last month was not yet had imaging of the area who presents with increased in the left lower quadrant abdominal pain this morning as well as a sensation of \"blockage\".  He did not palpate any abnormality but did feel that something was blocking.  He notes he massage the area and then was able to have a BM with a small hard piece of stool followed by softer stool.  He denies any black or bloody change.  He notes now the pain is 2 out of 10 in the left lower abdomen.  He is also noted since he had a epididymal cyst removed that he has had some referred pain occasionally into his left abdomen but that seems different than the consistent pain.  He denies any fever or chills.  Denies nausea or vomiting.  Denies change in urination.  He notes he was supposed to have managing, either an MRI or a CT scan, on January 20 however \"they ordered the wrong test\".  He notes he has a iodine contrast so cannot have CTs with contrast.      Independent Historian:   None - Patient Only    Review of External Notes:   Outpatient clinic notes reviewed.       Medications:    No daily medications    Past Medical History:    No chronic medical problems  epididymal cyst.    Past Surgical History:    Epididymal cyst removal    Physical Exam   Patient Vitals for the past 24 hrs:   BP Temp Temp src Pulse Resp SpO2 Height Weight   02/03/24 0930 (!) 139/92 97.7  F (36.5  C) Temporal 66 18 98 % 1.854 m (6' 1\") 106.4 kg (234 lb 9.1 oz)        Physical Exam  General: Adult male sitting upright  Eyes: PERRL, Conjunctive within normal limits  ENT: Moist mucous membranes, oropharynx clear.   CV: Normal S1S2, no murmur, rub or gallop. Regular rate and rhythm  Resp: Clear to auscultation bilaterally, no wheezes, rales or rhonchi. Normal respiratory " effort.  GI: Abdomen is soft and nondistended.  Mild left lower quadrant tenderness to palpation.  No palpable masses. No rebound or guarding.  MSK: No edema. Nontender. Normal active range of motion.  Skin: Warm and dry. No rashes or lesions or ecchymoses on visible skin.  Neuro: Alert and oriented. Responds appropriately to all questions and commands. No focal findings appreciated.   Psych: Normal mood and affect. Pleasant.     Emergency Department Course   Imaging:  CT Abdomen Pelvis w/o Contrast   Final Result   IMPRESSION:    No acute abnormality in the abdomen or pelvis. No cause for left lower quadrant pain is identified.                Laboratory:  Labs Ordered and Resulted from Time of ED Arrival to Time of ED Departure   BASIC METABOLIC PANEL - Abnormal       Result Value    Sodium 138      Potassium 4.0      Chloride 104      Carbon Dioxide (CO2) 23      Anion Gap 11      Urea Nitrogen 11.9      Creatinine 0.87      GFR Estimate >90      Calcium 9.1      Glucose 108 (*)    ROUTINE UA WITH MICROSCOPIC REFLEX TO CULTURE - Abnormal    Color Urine Yellow      Appearance Urine Slightly Cloudy (*)     Glucose Urine Negative      Bilirubin Urine Negative      Ketones Urine 60 (*)     Specific Gravity Urine 1.026      Blood Urine Trace (*)     pH Urine 5.5      Protein Albumin Urine 20 (*)     Urobilinogen Urine Normal      Nitrite Urine Negative      Leukocyte Esterase Urine Negative      Mucus Urine Present (*)     RBC Urine 1      WBC Urine <1      Squamous Epithelials Urine 1     CBC WITH PLATELETS AND DIFFERENTIAL    WBC Count 4.8      RBC Count 4.77      Hemoglobin 15.0      Hematocrit 42.3      MCV 89      MCH 31.4      MCHC 35.5      RDW 12.2      Platelet Count 288      % Neutrophils 53      % Lymphocytes 35      % Monocytes 9      % Eosinophils 2      % Basophils 1      % Immature Granulocytes 0      NRBCs per 100 WBC 0      Absolute Neutrophils 2.6      Absolute Lymphocytes 1.7      Absolute Monocytes  0.4      Absolute Eosinophils 0.1      Absolute Basophils 0.0      Absolute Immature Granulocytes 0.0      Absolute NRBCs 0.0            Emergency Department Course & Assessments:    Interventions:  None    Assessments:  Past medical records, nursing notes, and vitals reviewed.  I performed an exam of the patient and obtained history, as documented above.   I reassessed the patient.  He is well-appearing.  I discussed findings of today's evaluation and answered all questions.  He feels comfortable to plan for discharge.    Independent Interpretation (X-rays, CTs, rhythm strip):  None    Consultations/Discussion of Management or Tests:  None     Social Determinants of Health affecting care:   None    Disposition:  The patient was discharged.     Impression & Plan        Medical Decision Making:  Josh Vargas is a 43-year-old male who presents emergency department with chronic left-sided low abdominal pain has not been yet fully assessed and worsened this morning.  By time I saw him, his pain was minimal but still present.  Will etiologies were considered including renal colic, UTI/pyelonephritis, colitis, bowel obstruction, colon stricture or narrowing, etc.  CT scan was obtained given the chronicity of his pain and worsening today but did not show any acute allergy.  He has no evidence of UTI.  His laboratory evaluations unremarkable.  He is well-appearing.  He is recommended follow-up with a primary care provider for further assessment with ongoing symptoms.  He may benefit from outpatient colonoscopy given his symptoms.  Over-the-counter supportive care measures including Tylenol or ibuprofen may be helpful.  Warm compresses/warm baths may also be helpful.  Recommended return to the emergency department for fever, intractable pain, uncontrolled vomiting, or any other new symptom concerning to him.  All questions were answered prior to discharge.      Diagnosis:    ICD-10-CM    1. Abdominal pain of unknown cause   R10.9            2/3/2024   Madiha Cross MD Jonkman, Tracy Dianne, MD  02/04/24 1002

## 2024-02-03 NOTE — TELEPHONE ENCOUNTER
"Patient calling. He just got back from an international trip. He thinks he had an intestinal blockage, which he massaged away. He's been having problems with left lower abdominal pain. The pain is aching and occasionally like a shock. This morning it felt like something trying to push it's way through his abdominal wall. Rates pain 2-3/10 at best, 4 at it's worse. He is able to distract himself during the day. No blood in his stool, He has a history of combination irritable bowel syndrome. Pain radiated down his leg and to his anterior and lateral hip, as well as his groin. Patient has been having difficulty getting a scan scheduled. There is now an abdominal MRI scheduled for 2/10/2024. Patient needs a pelvic MRI.     Care advice given for patient to be seen within 4 hours. Patient will go to [facility] Emergency Department.     Tatiana Barnes RN  [facility]  February 3, 2024, 8:55 AM    Reason for Disposition   [1] MILD-MODERATE pain AND [2] constant AND [3] present > 2 hours    Additional Information   Negative: Shock suspected (e.g., cold/pale/clammy skin, too weak to stand, low BP, rapid pulse)   Negative: Difficult to awaken or acting confused (e.g., disoriented, slurred speech)   Negative: Passed out (i.e., lost consciousness, collapsed and was not responding)   Negative: Sounds like a life-threatening emergency to the triager   Negative: Chest pain   Negative: Pain is mainly in upper abdomen  (if needed ask: \"is it mainly above the belly button?\")   Negative: Followed an abdomen (stomach) injury   Negative: Abdomen bloating or swelling are main symptoms   Negative: [1] SEVERE pain (e.g., excruciating) AND [2] present > 1 hour   Negative: [1] SEVERE pain AND [2] age > 60 years   Negative: [1] Vomiting AND [2] contains red blood or black (\"coffee ground\") material  (Exception: Few red streaks in vomit that only happened once.)   Negative: Blood in bowel movements  (Exception: Blood on surface " of BM with constipation.)   Negative: Black or tarry bowel movements  (Exception: Chronic-unchanged black-grey BMs AND is taking iron pills or Pepto-Bismol.)   Negative: [1] Unable to urinate (or only a few drops) > 4 hours AND [2] bladder feels very full (e.g., palpable bladder or strong urge to urinate)   Negative: [1] Vomiting AND [2] contains bile (green color)   Negative: [1] Pain in the scrotum or testicle AND [2] present > 1 hour   Negative: Patient sounds very sick or weak to the triager    Protocols used: Abdominal Pain - Male-A-AH

## 2024-02-03 NOTE — ED TRIAGE NOTES
Pt presents to the ED stating he woke up this morning with a blockage in his abdomen. Pt states he could feel it through his skin this morning and was able to message it and get it to pass. Pt states he has had abdominal problems in the past, and he is due for a scan and was told be a triage nurse to come to the ED to get it done early. Pt endorses 2/10 LLQ dull pain.      Triage Assessment (Adult)       Row Name 02/03/24 0931          Triage Assessment    Airway WDL WDL        Respiratory WDL    Respiratory WDL WDL        Skin Circulation/Temperature WDL    Skin Circulation/Temperature WDL WDL        Cardiac WDL    Cardiac WDL WDL        Peripheral/Neurovascular WDL    Peripheral Neurovascular WDL WDL        Cognitive/Neuro/Behavioral WDL    Cognitive/Neuro/Behavioral WDL WDL

## 2024-02-05 ENCOUNTER — TELEPHONE (OUTPATIENT)
Dept: INTERNAL MEDICINE | Facility: CLINIC | Age: 44
End: 2024-02-05
Payer: COMMERCIAL

## 2024-02-05 DIAGNOSIS — R10.9 ABDOMINAL PAIN, UNSPECIFIED ABDOMINAL LOCATION: Primary | ICD-10-CM

## 2024-02-05 NOTE — TELEPHONE ENCOUNTER
Owen Watson from [facility] is calling to report that patient has called a few times reporting that he is supposed to have a MRI pelvis. Current order is for MRI abdomen, for LUQ abdominal pain. Patient did go to ER 2/3/24 and had CT Abdomen and Pelvis done - which seemed fine.  Do you want MRI of abdomen and pelvis? Please help clarify/educate patient on needing MRI of abd only if it's recommended.    Please call patient

## 2024-02-05 NOTE — TELEPHONE ENCOUNTER
Dr. Donaldson ordered MRI for abdomen, please advise on if MRI should be abdomen and pelvis?    Thank you,  Rafiq Kothari, Triage RN [facility]  9:51 AM 2/5/2024

## 2024-02-05 NOTE — TELEPHONE ENCOUNTER
Called and left patient a voice mail message on February 5, 2024 10:15 AM to call back the clinic.    Thank you,  Rafiq Kothari, Triage RN [facility]  10:15 AM 2/5/2024

## 2024-02-06 ENCOUNTER — TELEPHONE (OUTPATIENT)
Dept: INTERNAL MEDICINE | Facility: CLINIC | Age: 44
End: 2024-02-06
Payer: COMMERCIAL

## 2024-02-06 ENCOUNTER — MYC MEDICAL ADVICE (OUTPATIENT)
Dept: INTERNAL MEDICINE | Facility: CLINIC | Age: 44
End: 2024-02-06
Payer: COMMERCIAL

## 2024-02-06 DIAGNOSIS — R10.9 ABDOMINAL PAIN, UNSPECIFIED ABDOMINAL LOCATION: Primary | ICD-10-CM

## 2024-02-06 NOTE — TELEPHONE ENCOUNTER
Patient calling in regards to imaging orders.  Stated he has 3 active orders for imaging.  Patient recently had a CT without contrast and is wondering if he is still needing more imaging and if needing more imaging, what would be needed.  Stated his issue is completely below his belly button.      Per patient the ER provider told him that the next logical step would be a colonoscopy.      Patient has a radiology appointment scheduled at 10 am on Saturday(still ordered as abdominal MR)      Patient would like new order(if needed) to be placed so he can complete it at the appointment on 2/10/24.            Please call patient or send myc message with provider response.  Can leave detailed message if unable to reach patient.

## 2024-02-07 NOTE — TELEPHONE ENCOUNTER
Called and left patient a detailed voice mail message (indicated okay to do so in initial message) on February 7, 2024 8:19 AM to relay provider message.     Sent [employer] message to patient with provider message as well.     Thank you,  Rafiq Kothari, Triage RN [facility]  8:19 AM 2/7/2024

## 2024-02-07 NOTE — TELEPHONE ENCOUNTER
Given that his recent CT scan was unremarkable, I am not certain that an MRI would show any new findings.  Per multiple patient requests, MRIs have been ordered.  MR imaging for his entire abdomen and pelvis has been ordered.  If patient wishes to proceed with those imaging studies, he can contact the radiology department to discuss scheduling.

## 2024-02-07 NOTE — TELEPHONE ENCOUNTER
An order for a diagnostic colonoscopy for further evaluation of his abdominal pain has been placed.

## 2024-02-09 ENCOUNTER — TELEPHONE (OUTPATIENT)
Dept: INTERNAL MEDICINE | Facility: CLINIC | Age: 44
End: 2024-02-09
Payer: COMMERCIAL

## 2024-02-09 ENCOUNTER — TRANSFERRED RECORDS (OUTPATIENT)
Dept: HEALTH INFORMATION MANAGEMENT | Facility: CLINIC | Age: 44
End: 2024-02-09
Payer: COMMERCIAL

## 2024-02-09 NOTE — TELEPHONE ENCOUNTER
S-(situation): patient requesting GI referral be sent to [facility]    B-(background): Provider placed GI referral on 2/7/24    A-(assessment): [facility] unable to get patient in for colonoscopy till June, [facility] able to get patient in sooner and patient has seen [facility] in the past.     R-(recommendations): Faxed referral to number patient provided [phone]    Clarissa LAO

## 2024-02-22 ENCOUNTER — HOSPITAL ENCOUNTER (OUTPATIENT)
Dept: MRI IMAGING | Facility: CLINIC | Age: 44
Discharge: HOME | End: 2024-02-22
Attending: INTERNAL MEDICINE
Payer: COMMERCIAL

## 2024-02-22 DIAGNOSIS — R10.9 ABDOMINAL PAIN, UNSPECIFIED ABDOMINAL LOCATION: ICD-10-CM

## 2024-02-22 DIAGNOSIS — R10.12 LUQ ABDOMINAL PAIN: ICD-10-CM

## 2024-02-22 PROCEDURE — A9585 GADOBUTROL INJECTION: HCPCS | Performed by: INTERNAL MEDICINE

## 2024-02-22 PROCEDURE — 74183 MRI ABD W/O CNTR FLWD CNTR: CPT

## 2024-02-22 PROCEDURE — 72197 MRI PELVIS W/O & W/DYE: CPT

## 2024-02-22 PROCEDURE — 255N000002 HC RX 255 OP 636: Performed by: INTERNAL MEDICINE

## 2024-02-22 RX ORDER — GADOBUTROL 604.72 MG/ML
11 INJECTION INTRAVENOUS ONCE
Status: COMPLETED | OUTPATIENT
Start: 2024-02-22 | End: 2024-02-22

## 2024-02-22 RX ADMIN — GADOBUTROL 11 ML: 604.72 INJECTION INTRAVENOUS at 18:56

## 2024-06-29 ENCOUNTER — HEALTH MAINTENANCE LETTER (OUTPATIENT)
Age: 44
End: 2024-06-29

## 2024-08-30 ENCOUNTER — TELEPHONE (OUTPATIENT)
Dept: FAMILY MEDICINE | Facility: CLINIC | Age: 44
End: 2024-08-30
Payer: COMMERCIAL

## 2024-08-30 NOTE — TELEPHONE ENCOUNTER
Pt returned call, tried to schedule with Marjan Becerra but provider not enrolled in pt insurance.  Pt will call back to schedule.  Michelle Hicks, TC

## 2024-08-30 NOTE — TELEPHONE ENCOUNTER
Patient is on my schedule for 9/6/2024 for physical and likely establish care.  I am happy to do his physical however please let him know I will be resigning from [facility] as of September 18.  If he would like we could get him rescheduled with a provider that we will continue to be within this clinic to establish care as this may be a better option for continuity

## 2025-01-30 ENCOUNTER — TRANSFERRED RECORDS (OUTPATIENT)
Dept: HEALTH INFORMATION MANAGEMENT | Facility: CLINIC | Age: 45
End: 2025-01-30
Payer: COMMERCIAL

## 2025-02-06 ENCOUNTER — TRANSFERRED RECORDS (OUTPATIENT)
Dept: HEALTH INFORMATION MANAGEMENT | Facility: CLINIC | Age: 45
End: 2025-02-06
Payer: COMMERCIAL

## 2025-04-02 ENCOUNTER — TRANSFERRED RECORDS (OUTPATIENT)
Dept: ADMINISTRATIVE | Facility: CLINIC | Age: 45
End: 2025-04-02
Payer: COMMERCIAL

## 2025-04-03 NOTE — PROGRESS NOTES
_________________________________________________________________________________________________    [address] [address]  [place] [address]  [phone]      Patient:            Josh Vargas   YOB: 1980  Date:                    4/2/2025  Historian:    self  Visit Type:              Office Visit   Rendering Provider:  Prashanth Cantor MD    History of Present Illness:    45-year-old man with a history of eosinophilic esophagitis who presents for heartburn and eosinophilic esophagitis.    His main concern today is heartburn which seem to significantly worsen without warning around mid January.  This is predominantly liquid regurgitation.  He is not sure why his symptoms are worse.  He did not have any illness.  He did not start any new medications.  He does not have any weight gain.  He would like to get to the bottom of why his symptoms are suddenly worse rather than take medications to cover it up.  He had a CT scan which was reportedly normal per his recollection.  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  Biopsies showed 70 eosinophils per high-power field in the midesophagus and 80 in the distal esophagus.  He was told he had eosinophilic esophagitis at least 8 years ago.  He goes into the emergency room intermittently for food impactions and has been treated with EZ gas with good success.  Somebody has also prescribed him a steroid inhaler which he tells me he takes for the stricture and a ProAir albuterol inhaler which he tells me he takes if there is food that is stuck.    He does not have asthma or eczema.    Assessment/Plan  # Detail Type Description   1. Assessment Eosinophilic esophagitis (K20.0).   2. Assessment Diaphragmatic hernia without obstruction or gangrene (K44.9).   3. Assessment Gastro-esophageal reflux disease without esophagitis (K21.9).      Detail Type Description   Impression 45-year-old man with a history of eosinophilic esophagitis who presents for heartburn and eosinophilic esophagitis.    Heartburn  Described as liquid regurgitation.  This may be worsened by small hiatal hernia which was measured at 2 cm during his last EGD 2/6/2025.  His symptoms acutely worsen in mid January though and he is wondering why this may be the case.  He seems to be improving in regards to IBS and pelvic floor dysfunction and bowel movements are becoming more regular so there is discordance between his other GI symptoms improving but his heartburn worsening.  We discussed that sometimes heartburn symptoms can wax and wane without an obvious symptom and sometimes we just need a prolonged course of PPI therapy for things to calm down.  He will plan to take omeprazole 40 mg twice per day for the next 6 weeks for a total of 3 months and then wean off.  He will call us with an update afterwards.  If his symptoms are better or resolved after he is stop the omeprazole we will plan on single food dairy elimination diet for EOE off PPI therapy.  If his symptoms are still present off omeprazole, instead we will focus on his heartburn symptoms and order a gastric emptying study.    Eosinophilic esophagitis  This has been diagnosed remotely approximately 8 years ago.  Interestingly he has been prescribed fluticasone but also albuterol inhaler which he says he uses the albuterol if food is stuck.  We did not further discuss this today due to time constraints.  We discussed that PPI therapy can induce remission of eosinophilic esophagitis.  At this point I do not think that he is interested in taking PPI therapy indefinitely even if he were a responder.  Instead, we will likely pursue a single food dairy elimination diet in the future.  Timing will depend on the above workup for heartburn.     Detail Type Description   Provider Plan 1.  Continue omeprazole 40 mg twice per day  for the next 6 weeks.  After 6 weeks, decrease this to once per day for a week and then decrease it to once every other day for a week and then stop.  2.  Call us with an update after you are able to try stopping the omeprazole.  3.  If your symptoms are better/resolved off omeprazole, I will refer you for an appointment with a registered dietitian to discuss dairy elimination as treatment for eosinophilic esophagitis and then we will repeat an EGD with biopsies 8 weeks after starting the elimination diet.  4.  If your symptoms are still present off omeprazole, we will instead order a gastric emptying study to make sure your stomach emptying is not delayed as an explanation for your new heartburn symptoms..     Orders    Follow-up visit/Referral:  Order Comments   follow-up visit for Esophageal Clinic 3 Months        cc:  Manuel Donaldson MD  cc:       Electronically Signed by:  Prashanth Cantor MD  04/02/2025 12:09 PM      Medications:  Medication Dose Sig Description Comments   Multivitamin unknown Oral unknown     omeprazole 40 mg capsule,delayed release 40 mg take 1 capsule (40MG)  by oral route 2 times every day 30 minutes before a meal    ProAir HFA 90 mcg/actuation aerosol inhaler 90 mcg inhale 1 - 2 puff by INHALATION route  every 4 - 6 hours as needed taking as directed     Allergies:  Medication Name Ingredient Reaction Comment    IODINE  IODINE    SODIUM IODIDE  IODINE    POTASSIUM IODIDE  IODINE    SHELLFISH DERIVED Anaphylactic shock      Vitals:  BP mm/Hg Pulse/min Resp/min Temp F Height (Total in.) Weight (lbs.) Weight (oz.) BMI   131/81 66   73.50 238.00  30.97     Smoking Status:    Use Status Type Smoking Status Usage Per Day Years Used Total Pack Years   no/never  Never smoker      no/never  Never smoker      no/never  Never smoker        Race:  White  Ethnicity:  Not  or   Preferred Language:  English

## 2025-07-13 ENCOUNTER — HEALTH MAINTENANCE LETTER (OUTPATIENT)
Age: 45
End: 2025-07-13

## 2025-07-21 ENCOUNTER — HOSPITAL ENCOUNTER (EMERGENCY)
Facility: CLINIC | Age: 45
Discharge: HOME | End: 2025-07-22
Attending: EMERGENCY MEDICINE
Payer: COMMERCIAL

## 2025-07-21 DIAGNOSIS — R06.02 SHORTNESS OF BREATH: ICD-10-CM

## 2025-07-21 PROCEDURE — 94640 AIRWAY INHALATION TREATMENT: CPT

## 2025-07-21 PROCEDURE — 99285 EMERGENCY DEPT VISIT HI MDM: CPT | Mod: 25 | Performed by: EMERGENCY MEDICINE

## 2025-07-21 PROCEDURE — 93005 ELECTROCARDIOGRAM TRACING: CPT

## 2025-07-21 ASSESSMENT — COLUMBIA-SUICIDE SEVERITY RATING SCALE - C-SSRS
6. HAVE YOU EVER DONE ANYTHING, STARTED TO DO ANYTHING, OR PREPARED TO DO ANYTHING TO END YOUR LIFE?: NO
2. HAVE YOU ACTUALLY HAD ANY THOUGHTS OF KILLING YOURSELF IN THE PAST MONTH?: NO
1. IN THE PAST MONTH, HAVE YOU WISHED YOU WERE DEAD OR WISHED YOU COULD GO TO SLEEP AND NOT WAKE UP?: NO

## 2025-07-22 ENCOUNTER — APPOINTMENT (OUTPATIENT)
Dept: GENERAL RADIOLOGY | Facility: CLINIC | Age: 45
End: 2025-07-22
Attending: EMERGENCY MEDICINE
Payer: COMMERCIAL

## 2025-07-22 VITALS
OXYGEN SATURATION: 99 % | HEIGHT: 74 IN | DIASTOLIC BLOOD PRESSURE: 73 MMHG | RESPIRATION RATE: 18 BRPM | TEMPERATURE: 97 F | HEART RATE: 65 BPM | WEIGHT: 250.66 LBS | SYSTOLIC BLOOD PRESSURE: 131 MMHG | BODY MASS INDEX: 32.17 KG/M2

## 2025-07-22 LAB
ANION GAP SERPL CALCULATED.3IONS-SCNC: 13 MMOL/L (ref 7–15)
ATRIAL RATE - MUSE: 70 BPM
BASOPHILS # BLD AUTO: 0.1 10E3/UL (ref 0–0.2)
BASOPHILS NFR BLD AUTO: 1 %
BUN SERPL-MCNC: 17.6 MG/DL (ref 6–20)
CALCIUM SERPL-MCNC: 9.2 MG/DL (ref 8.8–10.4)
CHLORIDE SERPL-SCNC: 106 MMOL/L (ref 98–107)
CREAT SERPL-MCNC: 0.92 MG/DL (ref 0.67–1.17)
D DIMER PPP FEU-MCNC: <0.27 UG/ML FEU (ref 0–0.5)
DIASTOLIC BLOOD PRESSURE - MUSE: NORMAL MMHG
EGFRCR SERPLBLD CKD-EPI 2021: >90 ML/MIN/1.73M2
EOSINOPHIL # BLD AUTO: 0.3 10E3/UL (ref 0–0.7)
EOSINOPHIL NFR BLD AUTO: 4 %
ERYTHROCYTE [DISTWIDTH] IN BLOOD BY AUTOMATED COUNT: 12.5 % (ref 10–15)
GLUCOSE SERPL-MCNC: 113 MG/DL (ref 70–99)
HCO3 SERPL-SCNC: 22 MMOL/L (ref 22–29)
HCT VFR BLD AUTO: 41.8 % (ref 40–53)
HGB BLD-MCNC: 14.7 G/DL (ref 13.3–17.7)
IMM GRANULOCYTES # BLD: 0 10E3/UL
IMM GRANULOCYTES NFR BLD: 0 %
INTERPRETATION ECG - MUSE: NORMAL
LYMPHOCYTES # BLD AUTO: 3.1 10E3/UL (ref 0.8–5.3)
LYMPHOCYTES NFR BLD AUTO: 50 %
MCH RBC QN AUTO: 31.2 PG (ref 26.5–33)
MCHC RBC AUTO-ENTMCNC: 35.2 G/DL (ref 31.5–36.5)
MCV RBC AUTO: 89 FL (ref 78–100)
MONOCYTES # BLD AUTO: 0.5 10E3/UL (ref 0–1.3)
MONOCYTES NFR BLD AUTO: 9 %
NEUTROPHILS # BLD AUTO: 2.2 10E3/UL (ref 1.6–8.3)
NEUTROPHILS NFR BLD AUTO: 35 %
NRBC # BLD AUTO: 0 10E3/UL
NRBC BLD AUTO-RTO: 0 /100
NT-PROBNP SERPL-MCNC: <36 PG/ML (ref 0–93)
P AXIS - MUSE: 51 DEGREES
PLATELET # BLD AUTO: 286 10E3/UL (ref 150–450)
POTASSIUM SERPL-SCNC: 4 MMOL/L (ref 3.4–5.3)
PR INTERVAL - MUSE: 156 MS
QRS DURATION - MUSE: 94 MS
QT - MUSE: 394 MS
QTC - MUSE: 425 MS
R AXIS - MUSE: 74 DEGREES
RBC # BLD AUTO: 4.71 10E6/UL (ref 4.4–5.9)
SODIUM SERPL-SCNC: 141 MMOL/L (ref 135–145)
SYSTOLIC BLOOD PRESSURE - MUSE: NORMAL MMHG
T AXIS - MUSE: 56 DEGREES
TROPONIN T SERPL HS-MCNC: <6 NG/L
VENTRICULAR RATE- MUSE: 70 BPM
WBC # BLD AUTO: 6.2 10E3/UL (ref 4–11)

## 2025-07-22 PROCEDURE — 84484 ASSAY OF TROPONIN QUANT: CPT | Performed by: EMERGENCY MEDICINE

## 2025-07-22 PROCEDURE — 83880 ASSAY OF NATRIURETIC PEPTIDE: CPT | Performed by: EMERGENCY MEDICINE

## 2025-07-22 PROCEDURE — 85025 COMPLETE CBC W/AUTO DIFF WBC: CPT | Performed by: EMERGENCY MEDICINE

## 2025-07-22 PROCEDURE — 80048 BASIC METABOLIC PNL TOTAL CA: CPT | Performed by: EMERGENCY MEDICINE

## 2025-07-22 PROCEDURE — 36415 COLL VENOUS BLD VENIPUNCTURE: CPT | Performed by: EMERGENCY MEDICINE

## 2025-07-22 PROCEDURE — 71046 X-RAY EXAM CHEST 2 VIEWS: CPT

## 2025-07-22 PROCEDURE — 250N000009 HC RX 250: Performed by: EMERGENCY MEDICINE

## 2025-07-22 PROCEDURE — 85379 FIBRIN DEGRADATION QUANT: CPT | Performed by: EMERGENCY MEDICINE

## 2025-07-22 RX ORDER — ALBUTEROL SULFATE 90 UG/1
2 INHALANT RESPIRATORY (INHALATION) EVERY 6 HOURS PRN
Qty: 18 G | Refills: 0 | Status: SHIPPED | OUTPATIENT
Start: 2025-07-22

## 2025-07-22 RX ORDER — IPRATROPIUM BROMIDE AND ALBUTEROL SULFATE 2.5; .5 MG/3ML; MG/3ML
6 SOLUTION RESPIRATORY (INHALATION) ONCE
Status: COMPLETED | OUTPATIENT
Start: 2025-07-22 | End: 2025-07-22

## 2025-07-22 RX ADMIN — IPRATROPIUM BROMIDE AND ALBUTEROL SULFATE 6 ML: .5; 3 SOLUTION RESPIRATORY (INHALATION) at 01:23

## 2025-07-22 ASSESSMENT — ACTIVITIES OF DAILY LIVING (ADL): ADLS_ACUITY_SCORE: 41

## 2025-07-22 NOTE — ED PROVIDER NOTES
"  Emergency Department Note      History of Present Illness     Chief Complaint   Shortness of Breath      HPI   Josh Vagras is a 45 year old male presents with shortness of breath. Patient states that he got a viral infection in [place] while he was visiting in the fall and has been experiencing shortness of breath since. Notes that he thought it was allergies but it seems like he is unable to take in more than 75% of his necessary air intake. Additionally notes that he's not having chest pain or pressure, just difficult taking deep breaths. Endorses having to concentrate on his breathing to reduce symptoms. Over the past 5 days, in particularly the last 2 nights, his shortness of breath has been worse. Denies experiencing any leg swelling, productive cough, or fever. Additionally denies any history of heart issues, smoking, or prior episodes of similar symptoms.     Independent Historian   None    Review of External Notes   None    Past Medical History     Medical History and Problem List   No past medical history on file.    Medications   albuterol (PROAIR HFA/PROVENTIL HFA/VENTOLIN HFA) 108 (90 Base) MCG/ACT inhaler  Docosahexaenoic Acid (DHA) 200 MG capsule  multivitamin w/minerals (MULTI-VITAMIN) tablet        Physical Exam     Patient Vitals for the past 24 hrs:   BP Temp Temp src Pulse Resp SpO2 Height Weight   07/22/25 0100 131/73 -- -- 65 18 99 % -- --   07/21/25 2332 (!) 146/105 97  F (36.1  C) Temporal 74 22 99 % 1.867 m (6' 1.5\") 113.7 kg (250 lb 10.6 oz)     Physical Exam  General: Alert, no acute distress; well appearing  HEENT:  Moist mucous membranes. Conjunctiva normal.   CV:  RRR, no m/r/g, skin warm and well perfused  Pulm:  Mild wheezing diffusely at the end of expiratory phase.  No rales. No acute distress, breathing comfortably  GI:  Soft, nontender, nondistended.  No rebound or guarding.   MSK:  Moving all extremities.  No focal areas of edema, erythema  Skin:  WWP, no rashes, no lower " extremity edema, skin color normal    Diagnostics     Lab Results   Labs Ordered and Resulted from Time of ED Arrival to Time of ED Departure   BASIC METABOLIC PANEL - Abnormal       Result Value    Sodium 141      Potassium 4.0      Chloride 106      Carbon Dioxide (CO2) 22      Anion Gap 13      Urea Nitrogen 17.6      Creatinine 0.92      GFR Estimate >90      Calcium 9.2      Glucose 113 (*)    TROPONIN T, HIGH SENSITIVITY - Normal    Troponin T, High Sensitivity <6     NT-PROBNP - Normal    NT-proBNP <36     D DIMER QUANTITATIVE - Normal    D-Dimer Quantitative <0.27     CBC WITH PLATELETS AND DIFFERENTIAL    WBC Count 6.2      RBC Count 4.71      Hemoglobin 14.7      Hematocrit 41.8      MCV 89      MCH 31.2      MCHC 35.2      RDW 12.5      Platelet Count 286      % Neutrophils 35      % Lymphocytes 50      % Monocytes 9      % Eosinophils 4      % Basophils 1      % Immature Granulocytes 0      NRBCs per 100 WBC 0      Absolute Neutrophils 2.2      Absolute Lymphocytes 3.1      Absolute Monocytes 0.5      Absolute Eosinophils 0.3      Absolute Basophils 0.1      Absolute Immature Granulocytes 0.0      Absolute NRBCs 0.0         Imaging   Chest XR,  PA & LAT   Final Result   IMPRESSION: Azygos lobe. No focal pulmonary consolidation or pleural effusion. Normal heart size without pulmonary vascular congestion. No pneumothorax. No acute osseous abnormality.          EKG   ECG taken at 2352, ECG read at 0000  Normal sinus rhythm  Normal ECG   Rate 70 bpm. AR interval 156 ms. QRS duration 94 ms. QT/QTc 394/425 ms. P-R-T axes 51 74 56.    Independent Interpretation   CXR: No pneumothorax, infiltrate, pleural effusion, or cardiomegaly.    ED Course      Medications Administered   Medications   ipratropium - albuterol 0.5 mg/2.5 mg (3mg)/3 mL (DUONEB) neb solution 6 mL (6 mLs Nebulization $Given 7/22/25 0120)       Procedures   Procedures     Discussion of Management   None    ED Course   ED Course as of 07/22/25  0841   Mon Jul 21, 2025   2344 I obtained the history and evaluated the patient.    Tue Jul 22, 2025   0132 I rechecked and updated the patient.    0153 I rechecked and updated the patient.        Additional Documentation  None    Medical Decision Making / Diagnosis     CMS Diagnoses: None    MIPS   None               MDM   Josh Vargas is a 45 year old male who presents to the emergency department for evaluation of shortness of breath.  See above for the details of the HPI and exam.  Patient reports he has been experiencing some shortness of breath since acting a viral infection in [place] last fall.  Over the last 5 days, and particularly at night he has had more shortness of breath.  Otherwise denies any chest pain or pleuritic pain, fever.  He is vitally stable.  He is not hypoxic or in respiratory distress.  Screening EKG obtained shows no acute ischemic appearing changes or signs of malignant dysrhythmia.  High-sensitivity troponin is undetectable making ACS unlikely.  Additionally, patient's HEART score is low.  He is low risk for PE D-dimer is negative.  proBNP is normal and clinically patient does not appear fluid overloaded.  The rest of his basic lab studies above are unremarkable.  Chest x-ray shows no acute findings.  Patient reports significant improvement of his symptoms with nebulizer treatment here.  Overall suspect component of reactive airway given his improvement with bronchodilators.  With reasonable clinical certainty, I do feel that he is safe to discharge home as he is ambulatory without hypoxia or respiratory distress.  Recommend outpatient follow-up with her primary care doctor regarding your ER visit today.  Will prescribe albuterol inhaler.  Patient is comfortable with overall plan.  Discussed the signs/symptoms that should prompt patient's urgent return to the emergency department.  All questions were answered prior to discharge.    Disposition   The patient was discharged.      Diagnosis     ICD-10-CM    1. Shortness of breath  R06.02            Discharge Medications   Discharge Medication List as of 7/22/2025  1:49 AM        START taking these medications    Details   albuterol (PROAIR HFA/PROVENTIL HFA/VENTOLIN HFA) 108 (90 Base) MCG/ACT inhaler Inhale 2 puffs into the lungs every 6 hours as needed for shortness of breath, wheezing or cough., Disp-18 g, R-0, E-PrescribePharmacy may dispense brand covered by insurance (Proair, or proventil or ventolin or generic albuterol inhaler)               Scribe Disclosure:  I, Ana Almanzar, am serving as a scribe at 11:53 PM on 7/21/2025 to document services personally performed by Yordan Newton MD based on my observations and the provider's statements to me.        Yordan Newton MD  07/22/25 0883

## 2025-07-22 NOTE — ED TRIAGE NOTES
Pt feeling increasing sob with wheezing for the past few days. No history of asthma. No cough, fever, no n/v     Triage Assessment (Adult)       Row Name 07/21/25 6045          Triage Assessment    Airway WDL WDL        Respiratory WDL    Respiratory WDL X  sob        Skin Circulation/Temperature WDL    Skin Circulation/Temperature WDL WDL        Cardiac WDL    Cardiac WDL X  htn        Peripheral/Neurovascular WDL    Peripheral Neurovascular WDL WDL        Cognitive/Neuro/Behavioral WDL    Cognitive/Neuro/Behavioral WDL WDL